# Patient Record
Sex: FEMALE | Race: WHITE | Employment: OTHER | URBAN - METROPOLITAN AREA
[De-identification: names, ages, dates, MRNs, and addresses within clinical notes are randomized per-mention and may not be internally consistent; named-entity substitution may affect disease eponyms.]

---

## 2018-08-06 ENCOUNTER — HOSPITAL ENCOUNTER (OUTPATIENT)
Dept: LAB | Facility: CLINIC | Age: 83
Discharge: HOME | End: 2018-08-06
Attending: ORTHOPAEDIC SURGERY | Admitting: ORTHOPAEDIC SURGERY
Payer: MEDICARE

## 2018-08-06 DIAGNOSIS — Z01.812 PRE-OPERATIVE LABORATORY EXAMINATION: ICD-10-CM

## 2018-08-06 LAB
MRSA DNA SPEC QL NAA+PROBE: NEGATIVE
SPECIMEN SOURCE: NORMAL

## 2018-08-06 PROCEDURE — 87641 MR-STAPH DNA AMP PROBE: CPT | Performed by: ORTHOPAEDIC SURGERY

## 2018-08-06 PROCEDURE — 87640 STAPH A DNA AMP PROBE: CPT | Performed by: ORTHOPAEDIC SURGERY

## 2018-08-06 PROCEDURE — 40000830 ZZHCL STATISTIC STAPH AUREUS METH RESIST SCREEN PCR: Performed by: ORTHOPAEDIC SURGERY

## 2018-08-06 PROCEDURE — 40000829 ZZHCL STATISTIC STAPH AUREUS SUSCEPT SCREEN PCR: Performed by: ORTHOPAEDIC SURGERY

## 2018-08-07 RX ORDER — MUPIROCIN 20 MG/G
OINTMENT TOPICAL 2 TIMES DAILY
COMMUNITY
End: 2018-08-07

## 2018-08-10 ENCOUNTER — TRANSFERRED RECORDS (OUTPATIENT)
Dept: HEALTH INFORMATION MANAGEMENT | Facility: CLINIC | Age: 83
End: 2018-08-10

## 2018-08-10 LAB
CREAT SERPL-MCNC: 1.23 MG/DL (ref 0.55–1.02)
GFR SERPL CREATININE-BSD FRML MDRD: 39 ML/MIN/1.73M2
INR PPP: 1 (ref 0.9–1.1)
TSH SERPL-ACNC: 3.16 UIU/ML (ref 0.3–4.5)

## 2018-08-13 ENCOUNTER — TRANSFERRED RECORDS (OUTPATIENT)
Dept: HEALTH INFORMATION MANAGEMENT | Facility: CLINIC | Age: 83
End: 2018-08-13

## 2018-08-13 LAB — EJECTION FRACTION: 65

## 2018-08-16 ENCOUNTER — TRANSFERRED RECORDS (OUTPATIENT)
Dept: HEALTH INFORMATION MANAGEMENT | Facility: CLINIC | Age: 83
End: 2018-08-16

## 2018-08-17 RX ORDER — MULTIPLE VITAMINS W/ MINERALS TAB 9MG-400MCG
1 TAB ORAL DAILY
COMMUNITY

## 2018-08-17 RX ORDER — FLUTICASONE PROPIONATE 50 MCG
1-2 SPRAY, SUSPENSION (ML) NASAL DAILY PRN
COMMUNITY

## 2018-08-20 ENCOUNTER — HOSPITAL ENCOUNTER (INPATIENT)
Facility: CLINIC | Age: 83
LOS: 2 days | Discharge: HOME | DRG: 470 | End: 2018-08-22
Attending: ORTHOPAEDIC SURGERY | Admitting: ORTHOPAEDIC SURGERY
Payer: MEDICARE

## 2018-08-20 ENCOUNTER — ANESTHESIA (OUTPATIENT)
Dept: SURGERY | Facility: CLINIC | Age: 83
DRG: 470 | End: 2018-08-20
Payer: MEDICARE

## 2018-08-20 ENCOUNTER — ANESTHESIA EVENT (OUTPATIENT)
Dept: SURGERY | Facility: CLINIC | Age: 83
DRG: 470 | End: 2018-08-20
Payer: MEDICARE

## 2018-08-20 ENCOUNTER — APPOINTMENT (OUTPATIENT)
Dept: GENERAL RADIOLOGY | Facility: CLINIC | Age: 83
DRG: 470 | End: 2018-08-20
Attending: ORTHOPAEDIC SURGERY
Payer: MEDICARE

## 2018-08-20 DIAGNOSIS — Z96.641 STATUS POST RIGHT HIP REPLACEMENT: Primary | ICD-10-CM

## 2018-08-20 PROBLEM — Z96.649 S/P HIP REPLACEMENT: Status: ACTIVE | Noted: 2018-08-20

## 2018-08-20 LAB
ABO + RH BLD: NORMAL
ABO + RH BLD: NORMAL
ANION GAP SERPL CALCULATED.3IONS-SCNC: 11 MMOL/L (ref 3–14)
BLD GP AB SCN SERPL QL: NORMAL
BLOOD BANK CMNT PATIENT-IMP: NORMAL
BUN SERPL-MCNC: 25 MG/DL (ref 7–30)
CALCIUM SERPL-MCNC: 9.5 MG/DL (ref 8.5–10.1)
CHLORIDE SERPL-SCNC: 110 MMOL/L (ref 94–109)
CO2 SERPL-SCNC: 21 MMOL/L (ref 20–32)
CREAT SERPL-MCNC: 1.31 MG/DL (ref 0.52–1.04)
GFR SERPL CREATININE-BSD FRML MDRD: 38 ML/MIN/1.7M2
GLUCOSE SERPL-MCNC: 89 MG/DL (ref 70–99)
HGB BLD-MCNC: 15.2 G/DL (ref 11.7–15.7)
PLATELET # BLD AUTO: 171 10E9/L (ref 150–450)
POTASSIUM SERPL-SCNC: 4.4 MMOL/L (ref 3.4–5.3)
SODIUM SERPL-SCNC: 142 MMOL/L (ref 133–144)
SPECIMEN EXP DATE BLD: NORMAL

## 2018-08-20 PROCEDURE — 25000128 H RX IP 250 OP 636: Performed by: ORTHOPAEDIC SURGERY

## 2018-08-20 PROCEDURE — A9270 NON-COVERED ITEM OR SERVICE: HCPCS | Mod: GY | Performed by: ANESTHESIOLOGY

## 2018-08-20 PROCEDURE — 25000132 ZZH RX MED GY IP 250 OP 250 PS 637: Mod: GY | Performed by: ORTHOPAEDIC SURGERY

## 2018-08-20 PROCEDURE — 71000012 ZZH RECOVERY PHASE 1 LEVEL 1 FIRST HR: Performed by: ORTHOPAEDIC SURGERY

## 2018-08-20 PROCEDURE — 27210794 ZZH OR GENERAL SUPPLY STERILE: Performed by: ORTHOPAEDIC SURGERY

## 2018-08-20 PROCEDURE — 86901 BLOOD TYPING SEROLOGIC RH(D): CPT | Performed by: ORTHOPAEDIC SURGERY

## 2018-08-20 PROCEDURE — 25000132 ZZH RX MED GY IP 250 OP 250 PS 637: Mod: GY | Performed by: ANESTHESIOLOGY

## 2018-08-20 PROCEDURE — 25000125 ZZHC RX 250: Performed by: ORTHOPAEDIC SURGERY

## 2018-08-20 PROCEDURE — 80048 BASIC METABOLIC PNL TOTAL CA: CPT | Performed by: ORTHOPAEDIC SURGERY

## 2018-08-20 PROCEDURE — 36000063 ZZH SURGERY LEVEL 4 EA 15 ADDTL MIN: Performed by: ORTHOPAEDIC SURGERY

## 2018-08-20 PROCEDURE — 37000008 ZZH ANESTHESIA TECHNICAL FEE, 1ST 30 MIN: Performed by: ORTHOPAEDIC SURGERY

## 2018-08-20 PROCEDURE — 25000128 H RX IP 250 OP 636

## 2018-08-20 PROCEDURE — 40000986 XR PELVIS AD HIP PORTABLE RIGHT 1 VIEW

## 2018-08-20 PROCEDURE — 36000093 ZZH SURGERY LEVEL 4 1ST 30 MIN: Performed by: ORTHOPAEDIC SURGERY

## 2018-08-20 PROCEDURE — 25000566 ZZH SEVOFLURANE, EA 15 MIN: Performed by: ORTHOPAEDIC SURGERY

## 2018-08-20 PROCEDURE — 86850 RBC ANTIBODY SCREEN: CPT | Performed by: ORTHOPAEDIC SURGERY

## 2018-08-20 PROCEDURE — 85049 AUTOMATED PLATELET COUNT: CPT | Performed by: ORTHOPAEDIC SURGERY

## 2018-08-20 PROCEDURE — C1776 JOINT DEVICE (IMPLANTABLE): HCPCS | Performed by: ORTHOPAEDIC SURGERY

## 2018-08-20 PROCEDURE — 40000169 ZZH STATISTIC PRE-PROCEDURE ASSESSMENT I: Performed by: ORTHOPAEDIC SURGERY

## 2018-08-20 PROCEDURE — 37000009 ZZH ANESTHESIA TECHNICAL FEE, EACH ADDTL 15 MIN: Performed by: ORTHOPAEDIC SURGERY

## 2018-08-20 PROCEDURE — 71000013 ZZH RECOVERY PHASE 1 LEVEL 1 EA ADDTL HR: Performed by: ORTHOPAEDIC SURGERY

## 2018-08-20 PROCEDURE — 12000007 ZZH R&B INTERMEDIATE

## 2018-08-20 PROCEDURE — 25800025 ZZH RX 258: Performed by: ORTHOPAEDIC SURGERY

## 2018-08-20 PROCEDURE — 86900 BLOOD TYPING SEROLOGIC ABO: CPT | Performed by: ORTHOPAEDIC SURGERY

## 2018-08-20 PROCEDURE — A9270 NON-COVERED ITEM OR SERVICE: HCPCS | Mod: GY | Performed by: ORTHOPAEDIC SURGERY

## 2018-08-20 PROCEDURE — 25000125 ZZHC RX 250

## 2018-08-20 PROCEDURE — 0SR901A REPLACEMENT OF RIGHT HIP JOINT WITH METAL SYNTHETIC SUBSTITUTE, UNCEMENTED, OPEN APPROACH: ICD-10-PCS | Performed by: ORTHOPAEDIC SURGERY

## 2018-08-20 PROCEDURE — 25000128 H RX IP 250 OP 636: Performed by: ANESTHESIOLOGY

## 2018-08-20 PROCEDURE — 85018 HEMOGLOBIN: CPT | Performed by: ORTHOPAEDIC SURGERY

## 2018-08-20 PROCEDURE — 27210995 ZZH RX 272: Performed by: ORTHOPAEDIC SURGERY

## 2018-08-20 PROCEDURE — C1713 ANCHOR/SCREW BN/BN,TIS/BN: HCPCS | Performed by: ORTHOPAEDIC SURGERY

## 2018-08-20 DEVICE — IMPLANTABLE DEVICE: Type: IMPLANTABLE DEVICE | Site: HIP | Status: FUNCTIONAL

## 2018-08-20 DEVICE — IMP HEAD FEMORAL BIOM MOD 36MM -3  11-363661: Type: IMPLANTABLE DEVICE | Site: HIP | Status: FUNCTIONAL

## 2018-08-20 DEVICE — IMP SHELL BIOM G7 ACETAB PPS LIM HOLE 50MM SZ D 010000662: Type: IMPLANTABLE DEVICE | Site: HIP | Status: FUNCTIONAL

## 2018-08-20 DEVICE — IMP SCR BIOM G7 ACETABULAR DOME 6.5X25MM LOW PRO 010000998: Type: IMPLANTABLE DEVICE | Site: HIP | Status: FUNCTIONAL

## 2018-08-20 RX ORDER — METHOCARBAMOL 500 MG/1
500 TABLET, FILM COATED ORAL 4 TIMES DAILY PRN
Status: DISCONTINUED | OUTPATIENT
Start: 2018-08-20 | End: 2018-08-22 | Stop reason: HOSPADM

## 2018-08-20 RX ORDER — DEXTROSE MONOHYDRATE, SODIUM CHLORIDE, AND POTASSIUM CHLORIDE 50; 1.49; 4.5 G/1000ML; G/1000ML; G/1000ML
INJECTION, SOLUTION INTRAVENOUS CONTINUOUS
Status: DISCONTINUED | OUTPATIENT
Start: 2018-08-20 | End: 2018-08-22 | Stop reason: HOSPADM

## 2018-08-20 RX ORDER — HYDROMORPHONE HYDROCHLORIDE 1 MG/ML
INJECTION, SOLUTION INTRAMUSCULAR; INTRAVENOUS; SUBCUTANEOUS
Status: DISCONTINUED
Start: 2018-08-20 | End: 2018-08-20 | Stop reason: WASHOUT

## 2018-08-20 RX ORDER — ONDANSETRON 4 MG/1
4 TABLET, ORALLY DISINTEGRATING ORAL EVERY 6 HOURS PRN
Status: DISCONTINUED | OUTPATIENT
Start: 2018-08-20 | End: 2018-08-22 | Stop reason: HOSPADM

## 2018-08-20 RX ORDER — OXYCODONE HYDROCHLORIDE 5 MG/1
5 TABLET ORAL
Status: DISCONTINUED | OUTPATIENT
Start: 2018-08-20 | End: 2018-08-22 | Stop reason: HOSPADM

## 2018-08-20 RX ORDER — CEFAZOLIN SODIUM 1 G/3ML
1 INJECTION, POWDER, FOR SOLUTION INTRAMUSCULAR; INTRAVENOUS SEE ADMIN INSTRUCTIONS
Status: DISCONTINUED | OUTPATIENT
Start: 2018-08-20 | End: 2018-08-20

## 2018-08-20 RX ORDER — ONDANSETRON 4 MG/1
4 TABLET, ORALLY DISINTEGRATING ORAL EVERY 30 MIN PRN
Status: DISCONTINUED | OUTPATIENT
Start: 2018-08-20 | End: 2018-08-20 | Stop reason: HOSPADM

## 2018-08-20 RX ORDER — TEMAZEPAM 7.5 MG/1
7.5 CAPSULE ORAL
Status: DISCONTINUED | OUTPATIENT
Start: 2018-08-21 | End: 2018-08-22 | Stop reason: HOSPADM

## 2018-08-20 RX ORDER — EPHEDRINE SULFATE 50 MG/ML
INJECTION, SOLUTION INTRAMUSCULAR; INTRAVENOUS; SUBCUTANEOUS PRN
Status: DISCONTINUED | OUTPATIENT
Start: 2018-08-20 | End: 2018-08-20

## 2018-08-20 RX ORDER — FEXOFENADINE HCL 180 MG/1
180 TABLET ORAL DAILY PRN
Status: DISCONTINUED | OUTPATIENT
Start: 2018-08-20 | End: 2018-08-22 | Stop reason: HOSPADM

## 2018-08-20 RX ORDER — LIDOCAINE HYDROCHLORIDE 20 MG/ML
INJECTION, SOLUTION INFILTRATION; PERINEURAL PRN
Status: DISCONTINUED | OUTPATIENT
Start: 2018-08-20 | End: 2018-08-20

## 2018-08-20 RX ORDER — DEXAMETHASONE SODIUM PHOSPHATE 4 MG/ML
INJECTION, SOLUTION INTRA-ARTICULAR; INTRALESIONAL; INTRAMUSCULAR; INTRAVENOUS; SOFT TISSUE PRN
Status: DISCONTINUED | OUTPATIENT
Start: 2018-08-20 | End: 2018-08-20

## 2018-08-20 RX ORDER — HYDROMORPHONE HYDROCHLORIDE 1 MG/ML
.3-.5 INJECTION, SOLUTION INTRAMUSCULAR; INTRAVENOUS; SUBCUTANEOUS EVERY 5 MIN PRN
Status: DISCONTINUED | OUTPATIENT
Start: 2018-08-20 | End: 2018-08-20 | Stop reason: HOSPADM

## 2018-08-20 RX ORDER — ONDANSETRON 2 MG/ML
4 INJECTION INTRAMUSCULAR; INTRAVENOUS EVERY 6 HOURS PRN
Status: DISCONTINUED | OUTPATIENT
Start: 2018-08-20 | End: 2018-08-22 | Stop reason: HOSPADM

## 2018-08-20 RX ORDER — ONDANSETRON 2 MG/ML
INJECTION INTRAMUSCULAR; INTRAVENOUS PRN
Status: DISCONTINUED | OUTPATIENT
Start: 2018-08-20 | End: 2018-08-20

## 2018-08-20 RX ORDER — LIDOCAINE 40 MG/G
CREAM TOPICAL
Status: DISCONTINUED | OUTPATIENT
Start: 2018-08-20 | End: 2018-08-22 | Stop reason: HOSPADM

## 2018-08-20 RX ORDER — SODIUM CHLORIDE, SODIUM LACTATE, POTASSIUM CHLORIDE, CALCIUM CHLORIDE 600; 310; 30; 20 MG/100ML; MG/100ML; MG/100ML; MG/100ML
INJECTION, SOLUTION INTRAVENOUS CONTINUOUS
Status: DISCONTINUED | OUTPATIENT
Start: 2018-08-20 | End: 2018-08-20 | Stop reason: HOSPADM

## 2018-08-20 RX ORDER — ACETAMINOPHEN 325 MG/1
650 TABLET ORAL EVERY 4 HOURS PRN
Status: DISCONTINUED | OUTPATIENT
Start: 2018-08-23 | End: 2018-08-22 | Stop reason: HOSPADM

## 2018-08-20 RX ORDER — CEFAZOLIN SODIUM 1 G/3ML
1 INJECTION, POWDER, FOR SOLUTION INTRAMUSCULAR; INTRAVENOUS EVERY 8 HOURS
Status: COMPLETED | OUTPATIENT
Start: 2018-08-20 | End: 2018-08-21

## 2018-08-20 RX ORDER — DIPHENHYDRAMINE HYDROCHLORIDE 50 MG/ML
12.5 INJECTION INTRAMUSCULAR; INTRAVENOUS EVERY 6 HOURS PRN
Status: DISCONTINUED | OUTPATIENT
Start: 2018-08-20 | End: 2018-08-22 | Stop reason: HOSPADM

## 2018-08-20 RX ORDER — FENTANYL CITRATE 50 UG/ML
INJECTION, SOLUTION INTRAMUSCULAR; INTRAVENOUS PRN
Status: DISCONTINUED | OUTPATIENT
Start: 2018-08-20 | End: 2018-08-20

## 2018-08-20 RX ORDER — NALOXONE HYDROCHLORIDE 0.4 MG/ML
.1-.4 INJECTION, SOLUTION INTRAMUSCULAR; INTRAVENOUS; SUBCUTANEOUS
Status: DISCONTINUED | OUTPATIENT
Start: 2018-08-20 | End: 2018-08-20

## 2018-08-20 RX ORDER — PROPOFOL 10 MG/ML
INJECTION, EMULSION INTRAVENOUS PRN
Status: DISCONTINUED | OUTPATIENT
Start: 2018-08-20 | End: 2018-08-20

## 2018-08-20 RX ORDER — AMOXICILLIN 250 MG
1 CAPSULE ORAL 2 TIMES DAILY
Status: DISCONTINUED | OUTPATIENT
Start: 2018-08-20 | End: 2018-08-22 | Stop reason: HOSPADM

## 2018-08-20 RX ORDER — ONDANSETRON 2 MG/ML
4 INJECTION INTRAMUSCULAR; INTRAVENOUS EVERY 30 MIN PRN
Status: DISCONTINUED | OUTPATIENT
Start: 2018-08-20 | End: 2018-08-20 | Stop reason: HOSPADM

## 2018-08-20 RX ORDER — SODIUM CHLORIDE, SODIUM LACTATE, POTASSIUM CHLORIDE, CALCIUM CHLORIDE 600; 310; 30; 20 MG/100ML; MG/100ML; MG/100ML; MG/100ML
INJECTION, SOLUTION INTRAVENOUS CONTINUOUS
Status: DISCONTINUED | OUTPATIENT
Start: 2018-08-20 | End: 2018-08-20

## 2018-08-20 RX ORDER — PROCHLORPERAZINE MALEATE 5 MG
5 TABLET ORAL EVERY 6 HOURS PRN
Status: DISCONTINUED | OUTPATIENT
Start: 2018-08-20 | End: 2018-08-22 | Stop reason: HOSPADM

## 2018-08-20 RX ORDER — FLUTICASONE PROPIONATE 50 MCG
1-2 SPRAY, SUSPENSION (ML) NASAL DAILY PRN
Status: DISCONTINUED | OUTPATIENT
Start: 2018-08-20 | End: 2018-08-22 | Stop reason: HOSPADM

## 2018-08-20 RX ORDER — METOPROLOL TARTRATE 1 MG/ML
INJECTION, SOLUTION INTRAVENOUS PRN
Status: DISCONTINUED | OUTPATIENT
Start: 2018-08-20 | End: 2018-08-20

## 2018-08-20 RX ORDER — ACETAMINOPHEN 500 MG
1000 TABLET ORAL ONCE
Status: COMPLETED | OUTPATIENT
Start: 2018-08-20 | End: 2018-08-20

## 2018-08-20 RX ORDER — AMOXICILLIN 250 MG
2 CAPSULE ORAL 2 TIMES DAILY
Status: DISCONTINUED | OUTPATIENT
Start: 2018-08-20 | End: 2018-08-22 | Stop reason: HOSPADM

## 2018-08-20 RX ORDER — NEOSTIGMINE METHYLSULFATE 1 MG/ML
VIAL (ML) INJECTION PRN
Status: DISCONTINUED | OUTPATIENT
Start: 2018-08-20 | End: 2018-08-20

## 2018-08-20 RX ORDER — ESMOLOL HYDROCHLORIDE 10 MG/ML
INJECTION INTRAVENOUS PRN
Status: DISCONTINUED | OUTPATIENT
Start: 2018-08-20 | End: 2018-08-20

## 2018-08-20 RX ORDER — MAGNESIUM HYDROXIDE 1200 MG/15ML
LIQUID ORAL PRN
Status: DISCONTINUED | OUTPATIENT
Start: 2018-08-20 | End: 2018-08-20 | Stop reason: HOSPADM

## 2018-08-20 RX ORDER — NALOXONE HYDROCHLORIDE 0.4 MG/ML
.1-.4 INJECTION, SOLUTION INTRAMUSCULAR; INTRAVENOUS; SUBCUTANEOUS
Status: DISCONTINUED | OUTPATIENT
Start: 2018-08-20 | End: 2018-08-22 | Stop reason: HOSPADM

## 2018-08-20 RX ORDER — CEFAZOLIN SODIUM 2 G/100ML
2 INJECTION, SOLUTION INTRAVENOUS
Status: COMPLETED | OUTPATIENT
Start: 2018-08-20 | End: 2018-08-20

## 2018-08-20 RX ORDER — FENTANYL CITRATE 50 UG/ML
25-50 INJECTION, SOLUTION INTRAMUSCULAR; INTRAVENOUS
Status: DISCONTINUED | OUTPATIENT
Start: 2018-08-20 | End: 2018-08-20 | Stop reason: HOSPADM

## 2018-08-20 RX ORDER — ACETAMINOPHEN 325 MG/1
975 TABLET ORAL EVERY 8 HOURS
Status: DISCONTINUED | OUTPATIENT
Start: 2018-08-20 | End: 2018-08-22 | Stop reason: HOSPADM

## 2018-08-20 RX ORDER — GLYCOPYRROLATE 0.2 MG/ML
INJECTION, SOLUTION INTRAMUSCULAR; INTRAVENOUS PRN
Status: DISCONTINUED | OUTPATIENT
Start: 2018-08-20 | End: 2018-08-20

## 2018-08-20 RX ORDER — DIPHENHYDRAMINE HCL 12.5MG/5ML
12.5 LIQUID (ML) ORAL EVERY 6 HOURS PRN
Status: DISCONTINUED | OUTPATIENT
Start: 2018-08-20 | End: 2018-08-22 | Stop reason: HOSPADM

## 2018-08-20 RX ADMIN — ROCURONIUM BROMIDE 15 MG: 10 INJECTION INTRAVENOUS at 11:48

## 2018-08-20 RX ADMIN — POTASSIUM CHLORIDE, DEXTROSE MONOHYDRATE AND SODIUM CHLORIDE: 150; 5; 450 INJECTION, SOLUTION INTRAVENOUS at 15:15

## 2018-08-20 RX ADMIN — NEOSTIGMINE METHYLSULFATE 3 MG: 1 INJECTION, SOLUTION INTRAVENOUS at 12:58

## 2018-08-20 RX ADMIN — SODIUM CHLORIDE, POTASSIUM CHLORIDE, SODIUM LACTATE AND CALCIUM CHLORIDE: 600; 310; 30; 20 INJECTION, SOLUTION INTRAVENOUS at 10:46

## 2018-08-20 RX ADMIN — ONDANSETRON 4 MG: 2 INJECTION INTRAMUSCULAR; INTRAVENOUS at 12:52

## 2018-08-20 RX ADMIN — Medication 5 MG: at 12:44

## 2018-08-20 RX ADMIN — FENTANYL CITRATE 50 MCG: 50 INJECTION, SOLUTION INTRAMUSCULAR; INTRAVENOUS at 11:54

## 2018-08-20 RX ADMIN — PHENYLEPHRINE HYDROCHLORIDE 100 MCG: 10 INJECTION, SOLUTION INTRAMUSCULAR; INTRAVENOUS; SUBCUTANEOUS at 11:17

## 2018-08-20 RX ADMIN — DEXAMETHASONE SODIUM PHOSPHATE 4 MG: 4 INJECTION, SOLUTION INTRA-ARTICULAR; INTRALESIONAL; INTRAMUSCULAR; INTRAVENOUS; SOFT TISSUE at 11:48

## 2018-08-20 RX ADMIN — PHENYLEPHRINE HYDROCHLORIDE 150 MCG: 10 INJECTION, SOLUTION INTRAMUSCULAR; INTRAVENOUS; SUBCUTANEOUS at 12:44

## 2018-08-20 RX ADMIN — PHENYLEPHRINE HYDROCHLORIDE 50 MCG: 10 INJECTION, SOLUTION INTRAMUSCULAR; INTRAVENOUS; SUBCUTANEOUS at 12:19

## 2018-08-20 RX ADMIN — ACETAMINOPHEN 975 MG: 325 TABLET, FILM COATED ORAL at 15:40

## 2018-08-20 RX ADMIN — METOPROLOL TARTRATE 1 MG: 5 INJECTION INTRAVENOUS at 11:22

## 2018-08-20 RX ADMIN — ACETAMINOPHEN 975 MG: 325 TABLET, FILM COATED ORAL at 23:32

## 2018-08-20 RX ADMIN — Medication 0.3 MG: at 14:01

## 2018-08-20 RX ADMIN — ESMOLOL HYDROCHLORIDE 5 MG: 10 INJECTION, SOLUTION INTRAVENOUS at 13:03

## 2018-08-20 RX ADMIN — METOPROLOL TARTRATE 1 MG: 5 INJECTION INTRAVENOUS at 11:45

## 2018-08-20 RX ADMIN — PHENYLEPHRINE HYDROCHLORIDE 100 MCG: 10 INJECTION, SOLUTION INTRAMUSCULAR; INTRAVENOUS; SUBCUTANEOUS at 11:25

## 2018-08-20 RX ADMIN — LIDOCAINE HYDROCHLORIDE 80 MG: 20 INJECTION, SOLUTION INFILTRATION; PERINEURAL at 11:17

## 2018-08-20 RX ADMIN — PHENYLEPHRINE HYDROCHLORIDE 100 MCG: 10 INJECTION, SOLUTION INTRAMUSCULAR; INTRAVENOUS; SUBCUTANEOUS at 11:34

## 2018-08-20 RX ADMIN — SODIUM CHLORIDE, POTASSIUM CHLORIDE, SODIUM LACTATE AND CALCIUM CHLORIDE: 600; 310; 30; 20 INJECTION, SOLUTION INTRAVENOUS at 13:09

## 2018-08-20 RX ADMIN — METOPROLOL TARTRATE 1 MG: 5 INJECTION INTRAVENOUS at 11:21

## 2018-08-20 RX ADMIN — OXYCODONE HYDROCHLORIDE 5 MG: 5 TABLET ORAL at 16:02

## 2018-08-20 RX ADMIN — ESMOLOL HYDROCHLORIDE 10 MG: 10 INJECTION, SOLUTION INTRAVENOUS at 13:16

## 2018-08-20 RX ADMIN — GLYCOPYRROLATE 0.4 MG: 0.2 INJECTION, SOLUTION INTRAMUSCULAR; INTRAVENOUS at 12:58

## 2018-08-20 RX ADMIN — ESMOLOL HYDROCHLORIDE 5 MG: 10 INJECTION, SOLUTION INTRAVENOUS at 13:05

## 2018-08-20 RX ADMIN — ROCURONIUM BROMIDE 35 MG: 10 INJECTION INTRAVENOUS at 11:17

## 2018-08-20 RX ADMIN — PHENYLEPHRINE HYDROCHLORIDE 100 MCG: 10 INJECTION, SOLUTION INTRAMUSCULAR; INTRAVENOUS; SUBCUTANEOUS at 11:47

## 2018-08-20 RX ADMIN — CEFAZOLIN SODIUM 1 G: 1 INJECTION, POWDER, FOR SOLUTION INTRAMUSCULAR; INTRAVENOUS at 21:33

## 2018-08-20 RX ADMIN — PROPOFOL 190 MG: 10 INJECTION, EMULSION INTRAVENOUS at 11:17

## 2018-08-20 RX ADMIN — PHENYLEPHRINE HYDROCHLORIDE 50 MCG: 10 INJECTION, SOLUTION INTRAMUSCULAR; INTRAVENOUS; SUBCUTANEOUS at 12:55

## 2018-08-20 RX ADMIN — FENTANYL CITRATE 100 MCG: 50 INJECTION, SOLUTION INTRAMUSCULAR; INTRAVENOUS at 11:17

## 2018-08-20 RX ADMIN — TRANEXAMIC ACID 1 G: 100 INJECTION, SOLUTION INTRAVENOUS at 11:41

## 2018-08-20 RX ADMIN — METOPROLOL TARTRATE 1 MG: 5 INJECTION INTRAVENOUS at 11:19

## 2018-08-20 RX ADMIN — METOPROLOL TARTRATE 1 MG: 5 INJECTION INTRAVENOUS at 11:20

## 2018-08-20 RX ADMIN — PHENYLEPHRINE HYDROCHLORIDE 50 MCG: 10 INJECTION, SOLUTION INTRAMUSCULAR; INTRAVENOUS; SUBCUTANEOUS at 12:34

## 2018-08-20 RX ADMIN — Medication 5 MG: at 11:47

## 2018-08-20 RX ADMIN — ACETAMINOPHEN 1000 MG: 500 TABLET, FILM COATED ORAL at 10:56

## 2018-08-20 RX ADMIN — ESMOLOL HYDROCHLORIDE 10 MG: 10 INJECTION, SOLUTION INTRAVENOUS at 13:10

## 2018-08-20 RX ADMIN — FENTANYL CITRATE 50 MCG: 50 INJECTION, SOLUTION INTRAMUSCULAR; INTRAVENOUS at 12:04

## 2018-08-20 RX ADMIN — OXYCODONE HYDROCHLORIDE 5 MG: 5 TABLET ORAL at 23:32

## 2018-08-20 RX ADMIN — CEFAZOLIN SODIUM 2 G: 2 INJECTION, SOLUTION INTRAVENOUS at 11:33

## 2018-08-20 ASSESSMENT — ACTIVITIES OF DAILY LIVING (ADL)
ADLS_ACUITY_SCORE: 10
ADLS_ACUITY_SCORE: 10

## 2018-08-20 ASSESSMENT — ENCOUNTER SYMPTOMS
DYSRHYTHMIAS: 1
SEIZURES: 0

## 2018-08-20 ASSESSMENT — LIFESTYLE VARIABLES: TOBACCO_USE: 0

## 2018-08-20 NOTE — IP AVS SNAPSHOT
[address] [facility]    [address] [place] [address]    Phone:  [phone]                                       After Visit Summary   8/20/2018    Serenity Kenyon    MRN: [id number]           After Visit Summary Signature Page     I have received my discharge instructions, and my questions have been answered. I have discussed any challenges I see with this plan with the nurse or doctor.    ..........................................................................................................................................  Patient/Patient Representative Signature      ..........................................................................................................................................  Patient Representative Print Name and Relationship to Patient    ..................................................               ................................................  Date                                            Time    ..........................................................................................................................................  Reviewed by Signature/Title    ...................................................              ..............................................  Date                                                            Time

## 2018-08-20 NOTE — OP NOTE
PATIENT NAME:  Serenity Kenyon  MEDICAL RECORD:  [id number]  PATIENT BIRTHDAY:  4/28/1929  DATE OF SURGERY: 8/20/2018    SURGEON:  Naren Kimball MD    1st  ASSISTANTt:  JALIL Pittman OPA-C      PREOPERATIVE DIAGNOSIS:  Degenerative osteoarthritis right hip.      POSTOPERATIVE DIAGNOSIS:  Degenerative osteoarthritis right hip.      PROCEDURE:  right total hip arthroplasty.     COMPONENTS:  Biomet - 50 mm G7 porous plasma coated acetabular shell, 10 degree 36 mm acetabular liner, size 4 Taperloc  Porous  femoral stem, -3 mm length, 36 mm diameter femoral head.      ANESTHESIA:  Spinal     INDICATIONS FOR PROCEDURE:  The patient was brought to the operating room for elective total hip arthroplasty for end-stage osteoarthritis.  The patient is given preoperative IV antibiotics and these will be continued for 24 hours.  The patient will also receive anticoagulation for postoperative thrombosis prophylaxis.  The patient understands the indications, alternatives, risks, benefits, and time involved for recovery and has consented to move ahead with the procedure.       DESCRIPTION OF PROCEDURE:  The patient was brought to the operating room and following suitable Spinal anesthesia, the patient was placed in the lateral decubitus position and secured with the Wixson hip murcia.      The right hip was prepped and draped in the usual manner.      A full timeout was carried out and the patient, correct extremity, operative site and procedure were identified and confirmed by the entire operative team.      We then moved ahead with the single incision posterolateral approach.  A 10 cm incision was made over the posterolateral aspect of the hip and sharp dissection carried down through the subcutaneous tissue.  The gluteus yana fascia was divided and the muscle split in line with its fibers.  The gluteus medius was retracted anteriorly, the piriformis tagged and released and the short external rotators released.      The  capsule was opened posteriorly in a T- fashion.  The hip was dislocated without difficulty.      The femoral head was removed at the predetermined level and the femur retracted anteriorly to expose the acetabulum.     There was severe wear on the femoral head and severe wear on the acetabular surface.       I excised the labrum circumferentially and did a posterior capsulectomy.      We then prepared the acetabulum with serial reaming to a 49 mm size.  We press-fit in a 50 mm G7 porous plasma coated cup and secured it with a single 25 mm screw.  A trial 109 degree 36 mm liner was positioned.      Next, attention was directed to the femur.  We rasped the femur to the 4 size and trialed off the rasp.  Once we had assured good position and selection of components, we removed the trial components and the femoral rasp.  We secured the size  4 Taperloc porous stem.  This was solid and in good position.      The 10 degree 36 mm final liner was secured on the acetabular shell.       The final -3 mm length, 36 mm diameter femoral head component was secured and the hip reduced one final time.  We checked for motion, stability, alignment and leg lengths, all of which were excellent.      The wound was irrigated throughout with antibiotic solution using jet lavage.  It was closed over a medium Hemovac drain with 0 Ethibond in the piriformis repaired back to the greater trochanter.  0 Ethibond interrupted sutures and V-Loc running suture was used in the deep fascia of the gluteus yana.  The subcutaneous tissue was closed with 0 and 2-0 Vicryl and the skin with skin staples.      Sterile bandage was applied.  The patient was transferred onto a cart and taken to [facility] in satisfactory condition.  There were no known intraoperative complications.     A surgical assistant was medically necessary for this case for pre-op positioning as well as intra-op retraction and extremity support and positioning.  He was present the entire case.           SORAIDA CARRENO MD      CC  Soraida Carreno MD         [phone]  Fax

## 2018-08-20 NOTE — PLAN OF CARE
Problem: Hip Arthroplasty (Total, Partial) (Adult)  Goal: Signs and Symptoms of Listed Potential Problems Will be Absent, Minimized or Managed (Hip Arthroplasty)  Signs and symptoms of listed potential problems will be absent, minimized or managed by discharge/transition of care (reference Hip Arthroplasty (Total, Partial) (Adult) CPG).   Outcome: No Change  Pt A/OX4. VSS. Reports pain 3/10 using oxycodone with relief. Regular diet tolerated. CMS intact. Dressing c/d/i. Ramsey patent. Hemovac patent.

## 2018-08-20 NOTE — IP AVS SNAPSHOT
MRN:[id number]                      After Visit Summary   8/20/2018    Serenity Kenyon    MRN: [id number]           Thank you!     Thank you for choosing [facility] for your care. Our goal is always to provide you with excellent care. Hearing back from our patients is one way we can continue to improve our services. Please take a few minutes to complete the written survey that you may receive in the mail after you visit with us. Thank you!        Patient Information     Date Of Birth          4/28/1929        Designated Caregiver       Most Recent Value    Caregiver    Will someone help with your care after discharge? yes    Name of designated caregiver Michelle    Phone number of caregiver [phone]    Caregiver address [address] [facility]      About your hospital stay     You were admitted on:  August 20, 2018 You last received care in the:  [place] [address] [facility]    You were discharged on:  August 22, 2018        Reason for your hospital stay       ISA                  Who to Call     For medical emergencies, please call 911.  For non-urgent questions about your medical care, please call your primary care provider or clinic, None  For questions related to your surgery, please call your surgery clinic        Attending Provider     Provider Soraida Galdamez MD Orthopedics       Primary Care Provider Fax #    Marzena Pinont [phone]      Follow-up Appointments     Follow-up and recommended labs and tests        Office visit prearranged                  Further instructions from your care team       DISCHARGE INSTRUCTIONS FOR YOUR TOTAL HIP REPLACEMENT     SORAIDA CARRENO MD    Wound Care:    Change your dressing daily. Inspect your incision at the time of dressing change for increased redness, swelling, and drainage.  Some discoloration and bruising is usually seen, but call my office if you are concerned or if you see changes in the wound appearance.   Also, call if you develop a fever above 101 degrees.     You may shower directly over the wound beginning 4 days after your surgery, but do not submerge wound under water until after the post operative clinic visit when the sutures are removed and the wound is completely sealed and without any drainage. Keep a dressing over the wound until after your post operative clinic visit.      Activities and outpatient Physical Therapy:  Physical activity may be resumed gradually according to your comfort level and the restrictions on your hip positioning as instructed in the pre-op class and by therapy. Do not cross your legs and do not bend past 90 degrees. You may bear weight as tolerated on the operated leg.  Use crutches or the walker as instructed by Physical Therapy.  Follow your home exercise program as instructed by your therapist.     Wear your anti-embolism stockings day and night until seen for your post operative appointment.  Keep them flat on your skin.  Do not allow them to roll up or crease your skin.  Remove twice daily for one hour at a time.  You may hand wash and air dry your stockings.  Notify my office if you experience calf pain.  Pump your ankles frequently.        If indicated, you may have outpatient physical therapy sessions when your return home. These visits are prescribed for you at the time of your surgery scheduling.  If so, you should have already scheduled your therapy sessions in advance.  If you have not done so, please immediately contact the therapy location of your choice to schedule the appointments for the physical therapy regimen that has been prescribed for you. You may discontinue the crutches or walker per the therapist's recommendation.      Medications:  New medications for you on discharge include a pain medication, a stool softener, and a blood thinner (BEGIN THE ELIQUIS MEDICATION Thursday, 8/23/18)   Detailed instructions come with those medications.  You will also receive  "instructions on when to resume your home medications.       Antibiotic coverage will be needed before any type of dental procedure. This is a life long recommendation.  You should notify your dentist of your total hip surgery and call your dentist or our office one week before a dental appointment for antibiotics.         Post operative clinic appointment:  Your post operative clinic visit has been prearranged.   Call [phone] with any questions.    Naren Kimball MD    Pending Results     Date and Time Order Name Status Description    2018 2030 EKG 12-lead, tracing only Preliminary             Statement of Approval     Ordered          18 0955  I have reviewed and agree with all the recommendations and orders detailed in this document.  EFFECTIVE NOW     Approved and electronically signed by:  Naren Kimball MD             Admission Information     Date & Time Provider Department Dept. Phone    2018 Naren Kimball MD [place] [address] [facility] [phone]      Your Vitals Were     Blood Pressure Pulse Temperature Respirations Height Weight    114/65 (BP Location: Left arm) 85 97.9  F (36.6  C) (Oral) 16 1.524 m (5') 59 kg (130 lb)    Pulse Oximetry BMI (Body Mass Index)                94% 25.39 kg/m2          MyChart Information     [employer] lets you send messages to your doctor, view your test results, renew your prescriptions, schedule appointments and more. To sign up, go to [email or web address] . Click on \"Log in\" on the left side of the screen, which will take you to the Welcome page. Then click on \"Sign up Now\" on the right side of the page.     You will be asked to enter the access code listed below, as well as some personal information. Please follow the directions to create your username and password.     Your access code is: [email or web address]  Expires: 2018 10:46 AM     Your access code will  in 90 days. If you need help or a new code, " please call your [facility] clinic or [phone].        Care EveryWhere ID     This is your Care EveryWhere ID. This could be used by other organizations to access your [facility] medical records  [id number]        Equal Access to Services     CALE HUSTON : Hadii aad ku hadalpesh Acosta, waaxda luqadaha, qaybta kaalmada adedarshana, angela milliganandrei velasconohemi ortiz noman osuna. So [facility] [phone].    ATENCIÓN: Si habla español, tiene a rivera disposición servicios gratuitos de asistencia lingüística. Llame al [phone].    We comply with applicable federal civil rights laws and [place] laws. We do not discriminate on the basis of race, color, national origin, age, disability, sex, sexual orientation, or gender identity.               Review of your medicines      START taking        Dose / Directions    order for DME        Equipment being ordered: Walker Wheels () and Walker () Treatment Diagnosis: Impaired gait   Quantity:  1 each   Refills:  0       oxyCODONE IR 5 MG tablet   Commonly known as:  ROXICODONE        Dose:  5 mg   Take 1 tablet (5 mg) by mouth every 3 hours as needed for other (pain control or improvement in physical function. Hold dose for analgesic side effects.)   Quantity:  40 tablet   Refills:  0       senna-docusate 8.6-50 MG per tablet   Commonly known as:  SENOKOT-S;PERICOLACE        Dose:  1 tablet   Take 1 tablet by mouth 2 times daily   Quantity:  50 tablet   Refills:  0         CONTINUE these medicines which have NOT CHANGED        Dose / Directions    ALLEGRA PO        Dose:  180 mg   Take 180 mg by mouth daily as needed for allergies   Refills:  0       Calcium + D3 600-200 MG-UNIT Tabs        Dose:  2 tablet   Take 2 tablets by mouth daily   Refills:  0       FISH OIL PO        Dose:  1 g   Take 1 g by mouth daily   Refills:  0       fluticasone 50 MCG/ACT spray   Commonly known as:  FLONASE        Dose:  1-2 spray   Spray 1-2 sprays into both nostrils daily as needed    Refills:  0       Multi-vitamin Tabs tablet        Dose:  1 tablet   Take 1 tablet by mouth daily   Refills:  0         STOP taking     ASPIRIN EC PO           mupirocin 2 % nasal ointment   Commonly known as:  BACTROBAN                Where to get your medicines      These medications were sent to [facility] [place], [place] - [address]  [address], [place] [address]     Phone:  [phone]     senna-docusate 8.6-50 MG per tablet         Some of these will need a paper prescription and others can be bought over the counter. Ask your nurse if you have questions.     Bring a paper prescription for each of these medications     order for DME    oxyCODONE IR 5 MG tablet                Protect others around you: Learn how to safely use, store and throw away your medicines at www.disposemymeds.org.        Information about OPIOIDS     PRESCRIPTION OPIOIDS: WHAT YOU NEED TO KNOW   We gave you an opioid (narcotic) pain medicine. It is important to manage your pain, but opioids are not always the best choice. You should first try all the other options your care team gave you. Take this medicine for as short a time (and as few doses) as possible.    Some activities can increase your pain, such as bandage changes or therapy sessions. It may help to take your pain medicine 30 to 60 minutes before these activities. Reduce your stress by getting enough sleep, working on hobbies you enjoy and practicing relaxation or meditation. Talk to your care team about ways to manage your pain beyond prescription opioids.    These medicines have risks:    DO NOT drive when on new or higher doses of pain medicine. These medicines can affect your alertness and reaction times, and you could be arrested for driving under the influence (DUI). If you need to use opioids long-term, talk to your care team about driving.    DO NOT operate heavy machinery    DO NOT do any other dangerous activities while taking  these medicines.    DO NOT drink any alcohol while taking these medicines.     If the opioid prescribed includes acetaminophen, DO NOT take with any other medicines that contain acetaminophen. Read all labels carefully. Look for the word  acetaminophen  or  Tylenol.  Ask your pharmacist if you have questions or are unsure.    You can get addicted to pain medicines, especially if you have a history of addiction (chemical, alcohol or substance dependence). Talk to your care team about ways to reduce this risk.    All opioids tend to cause constipation. Drink plenty of water and eat foods that have a lot of fiber, such as fruits, vegetables, prune juice, apple juice and high-fiber cereal. Take a laxative (Miralax, milk of magnesia, Colace, Senna) if you don t move your bowels at least every other day. Other side effects include upset stomach, sleepiness, dizziness, throwing up, tolerance (needing more of the medicine to have the same effect), physical dependence and slowed breathing.    Store your pills in a secure place, locked if possible. We will not replace any lost or stolen medicine. If you don t finish your medicine, please throw away (dispose) as directed by your pharmacist. The [place] Pollution [employer] has more information about safe disposal: https://[email or web address]/living-green/managing-unwanted-medications             Medication List: This is a list of all your medications and when to take them. Check marks below indicate your daily home schedule. Keep this list as a reference.      Medications           Morning Afternoon Evening Bedtime As Needed    ALLEGRA PO   Take 180 mg by mouth daily as needed for allergies   Next Dose Due:  Available as needed                                   Calcium + D3 600-200 MG-UNIT Tabs   Take 2 tablets by mouth daily   Next Dose Due:  Resume                                 FISH OIL PO   Take 1 g by mouth daily   Next Dose Due:  Resume                                 fluticasone 50 MCG/ACT spray   Commonly known as:  FLONASE   Spray 1-2 sprays into both nostrils daily as needed   Next Dose Due:  Available as needed                                   Multi-vitamin Tabs tablet   Take 1 tablet by mouth daily   Next Dose Due:  Resume                                order for DME   Equipment being ordered: Walker Wheels () and Walker () Treatment Diagnosis: Impaired gait                                oxyCODONE IR 5 MG tablet   Commonly known as:  ROXICODONE   Take 1 tablet (5 mg) by mouth every 3 hours as needed for other (pain control or improvement in physical function. Hold dose for analgesic side effects.)   Last time this was given:  5 mg on 8/22/2018  9:19 AM   Next Dose Due:  Available as needed                                   senna-docusate 8.6-50 MG per tablet   Commonly known as:  SENOKOT-S;PERICOLACE   Take 1 tablet by mouth 2 times daily   Last time this was given:  2 tablets on 8/22/2018  8:22 AM   Next Dose Due:  8/22/18 PM                    8/22/18

## 2018-08-20 NOTE — ANESTHESIA POSTPROCEDURE EVALUATION
Patient: Serenity Kenyon    Procedure(s):  RIGHT TOTAL HIP ARTHROPLASTY  - Wound Class: I-Clean    Diagnosis:DJD  Diagnosis Additional Information: No value filed.    Anesthesia Type:  General    Note:  Anesthesia Post Evaluation    Patient location during evaluation: PACU  Patient participation: Able to fully participate in evaluation  Level of consciousness: awake  Pain management: adequate  Airway patency: patent  Cardiovascular status: acceptable  Respiratory status: acceptable  Hydration status: acceptable  PONV: none     Anesthetic complications: None          Last vitals:  Vitals:    08/20/18 1410 08/20/18 1420 08/20/18 1430   BP: 145/75 138/84 148/87   Resp: 12 12 13   Temp:      SpO2: 97% 97% 98%         Electronically Signed By: Susanne Palencia  August 20, 2018  2:40 PM

## 2018-08-20 NOTE — ANESTHESIA CARE TRANSFER NOTE
Patient: Serenity Kenyon    Procedure(s):  RIGHT TOTAL HIP ARTHROPLASTY  - Wound Class: I-Clean    Diagnosis: DJD  Diagnosis Additional Information: No value filed.    Anesthesia Type:   General     Note:  Airway :Face Mask  Patient transferred to:PACU  Comments: Neuromuscular blockade reversed after TOF 4/4, spontaneous respirations, adequate tidal volumes, followed commands to voice, oropharynx suctioned with soft flexible catheter, extubated atraumatically, extubated with suction, airway patent after extubation.  Oxygen via facemask at 10 liters per minute to PACU. Oxygen tubing connected to wall O2 in PACU, SpO2, NiBP, and EKG monitors and alarms on and functioning, [place] Hugger warmer connected to patient gown, report on patient's clinical status given to PACU RN, RN questions answered. Handoff Report: Identifed the Patient, Identified the Reponsible Provider, Reviewed the pertinent medical history, Discussed the surgical course, Reviewed Intra-OP anesthesia mangement and issues during anesthesia, Set expectations for post-procedure period and Allowed opportunity for questions and acknowledgement of understanding      Vitals: (Last set prior to Anesthesia Care Transfer)    CRNA VITALS  8/20/2018 1248 - 8/20/2018 1324      8/20/2018             EKG: Atrial fibrillation                Electronically Signed By: ZACHARY Colvin CRNA  August 20, 2018  1:24 PM

## 2018-08-20 NOTE — OR NURSING
Patients vagina is prolapsed outside of body. Light pink in color. Also patients urethral area has some very red tissue prolapsing out. Passed mackay catheter easily.

## 2018-08-20 NOTE — PROGRESS NOTES
Admission medication history interview status for the 8/20/2018  admission is complete. See EPIC admission navigator for prior to admission medications     Medication history source reliability:Good    Medication history interview source(s):Patient    Medication history resources (including written lists, pill bottles, clinic record):None    Primary pharmacy.[facility] ([place]    Additional medication history information not noted on PTA med list :None    Time spent in this activity: 30 minutes    Prior to Admission medications    Medication Sig Last Dose Taking? Auth Provider   Calcium Carb-Cholecalciferol (CALCIUM + D3) 600-200 MG-UNIT TABS Take 2 tablets by mouth daily Over 1 week ago at am Yes Reported, Patient   Fexofenadine HCl (ALLEGRA PO) Take 180 mg by mouth daily as needed for allergies  Over 1 month ago at prn Yes Reported, Patient   fluticasone (FLONASE) 50 MCG/ACT spray Spray 1-2 sprays into both nostrils daily as needed  Over 1 month ago at prn Yes Reported, Patient   multivitamin, therapeutic with minerals (MULTI-VITAMIN) TABS tablet Take 1 tablet by mouth daily 8/10/2018 at am Yes Reported, Patient   ASPIRIN EC PO Take 81 mg by mouth daily 8/16/2018  Reported, Patient   mupirocin (BACTROBAN) 2 % nasal ointment Spray 1 each into both nostrils 2 times daily 08/19/18 at PM  Reported, Patient   Omega-3 Fatty Acids (FISH OIL PO) Take 1 g by mouth daily 8/16/2018  Reported, Patient

## 2018-08-20 NOTE — ANESTHESIA PREPROCEDURE EVALUATION
Anesthesia Evaluation     . Pt has had prior anesthetic.     No history of anesthetic complications          ROS/MED HX    ENT/Pulmonary:      (-) tobacco use, asthma and sleep apnea   Neurologic:      (-) seizures and CVA   Cardiovascular:     (+) hypertension----. : . . . :. dysrhythmias a-fib, .       METS/Exercise Tolerance:     Hematologic:         Musculoskeletal:         GI/Hepatic:        (-) GERD   Renal/Genitourinary:         Endo:      (-) Type II DM and thyroid disease   Psychiatric:         Infectious Disease:         Malignancy:         Other:                     Physical Exam  Normal systems: dental    Airway   Mallampati: II  TM distance: >3 FB  Neck ROM: full    Dental     Cardiovascular   Rhythm and rate: regular      Pulmonary    breath sounds clear to auscultation                    Anesthesia Plan      History & Physical Review  History and physical reviewed and following examination; no interval change.    ASA Status:  2 .        Plan for General and ETT with Intravenous induction. Maintenance will be Balanced.    PONV prophylaxis:  Ondansetron (or other 5HT-3) and Dexamethasone or Solumedrol  Additional equipment: Videolaryngoscope      Postoperative Care  Postoperative pain management:  IV analgesics.      Consents  Anesthetic plan, risks, benefits and alternatives discussed with:  Patient..                          .

## 2018-08-21 ENCOUNTER — APPOINTMENT (OUTPATIENT)
Dept: PHYSICAL THERAPY | Facility: CLINIC | Age: 83
DRG: 470 | End: 2018-08-21
Attending: ORTHOPAEDIC SURGERY
Payer: MEDICARE

## 2018-08-21 LAB
ANION GAP SERPL CALCULATED.3IONS-SCNC: 6 MMOL/L (ref 3–14)
BUN SERPL-MCNC: 20 MG/DL (ref 7–30)
CALCIUM SERPL-MCNC: 8.3 MG/DL (ref 8.5–10.1)
CHLORIDE SERPL-SCNC: 107 MMOL/L (ref 94–109)
CO2 SERPL-SCNC: 25 MMOL/L (ref 20–32)
CREAT SERPL-MCNC: 1.05 MG/DL (ref 0.52–1.04)
GFR SERPL CREATININE-BSD FRML MDRD: 49 ML/MIN/1.7M2
GLUCOSE SERPL-MCNC: 141 MG/DL (ref 70–99)
HGB BLD-MCNC: 12.2 G/DL (ref 11.7–15.7)
MAGNESIUM SERPL-MCNC: 1.9 MG/DL (ref 1.6–2.3)
PLATELET # BLD AUTO: 139 10E9/L (ref 150–450)
POTASSIUM SERPL-SCNC: 4.6 MMOL/L (ref 3.4–5.3)
SODIUM SERPL-SCNC: 138 MMOL/L (ref 133–144)

## 2018-08-21 PROCEDURE — 99207 ZZC NON-BILLABLE SERV PER CHARTING: CPT | Performed by: PHYSICIAN ASSISTANT

## 2018-08-21 PROCEDURE — 12000007 ZZH R&B INTERMEDIATE

## 2018-08-21 PROCEDURE — 93010 ELECTROCARDIOGRAM REPORT: CPT | Performed by: INTERNAL MEDICINE

## 2018-08-21 PROCEDURE — 93005 ELECTROCARDIOGRAM TRACING: CPT

## 2018-08-21 PROCEDURE — 85049 AUTOMATED PLATELET COUNT: CPT | Performed by: ORTHOPAEDIC SURGERY

## 2018-08-21 PROCEDURE — 97161 PT EVAL LOW COMPLEX 20 MIN: CPT | Mod: GP

## 2018-08-21 PROCEDURE — 25800025 ZZH RX 258: Performed by: ORTHOPAEDIC SURGERY

## 2018-08-21 PROCEDURE — 97110 THERAPEUTIC EXERCISES: CPT | Mod: GP

## 2018-08-21 PROCEDURE — 97116 GAIT TRAINING THERAPY: CPT | Mod: GP

## 2018-08-21 PROCEDURE — 97530 THERAPEUTIC ACTIVITIES: CPT | Mod: GP

## 2018-08-21 PROCEDURE — 25000128 H RX IP 250 OP 636: Performed by: ORTHOPAEDIC SURGERY

## 2018-08-21 PROCEDURE — 83735 ASSAY OF MAGNESIUM: CPT | Performed by: NURSE PRACTITIONER

## 2018-08-21 PROCEDURE — 40000193 ZZH STATISTIC PT WARD VISIT

## 2018-08-21 PROCEDURE — 83735 ASSAY OF MAGNESIUM: CPT | Performed by: ORTHOPAEDIC SURGERY

## 2018-08-21 PROCEDURE — 80048 BASIC METABOLIC PNL TOTAL CA: CPT | Performed by: ORTHOPAEDIC SURGERY

## 2018-08-21 PROCEDURE — 85018 HEMOGLOBIN: CPT | Performed by: ORTHOPAEDIC SURGERY

## 2018-08-21 PROCEDURE — 36415 COLL VENOUS BLD VENIPUNCTURE: CPT | Performed by: ORTHOPAEDIC SURGERY

## 2018-08-21 PROCEDURE — 25000132 ZZH RX MED GY IP 250 OP 250 PS 637: Mod: GY | Performed by: ORTHOPAEDIC SURGERY

## 2018-08-21 PROCEDURE — A9270 NON-COVERED ITEM OR SERVICE: HCPCS | Mod: GY | Performed by: ORTHOPAEDIC SURGERY

## 2018-08-21 RX ADMIN — OXYCODONE HYDROCHLORIDE 5 MG: 5 TABLET ORAL at 08:07

## 2018-08-21 RX ADMIN — SENNOSIDES AND DOCUSATE SODIUM 2 TABLET: 8.6; 5 TABLET ORAL at 21:40

## 2018-08-21 RX ADMIN — OXYCODONE HYDROCHLORIDE 5 MG: 5 TABLET ORAL at 12:12

## 2018-08-21 RX ADMIN — ENOXAPARIN SODIUM 30 MG: 30 INJECTION, SOLUTION INTRAVENOUS; SUBCUTANEOUS at 08:08

## 2018-08-21 RX ADMIN — POTASSIUM CHLORIDE, DEXTROSE MONOHYDRATE AND SODIUM CHLORIDE: 150; 5; 450 INJECTION, SOLUTION INTRAVENOUS at 04:13

## 2018-08-21 RX ADMIN — CEFAZOLIN SODIUM 1 G: 1 INJECTION, POWDER, FOR SOLUTION INTRAMUSCULAR; INTRAVENOUS at 04:09

## 2018-08-21 RX ADMIN — ACETAMINOPHEN 975 MG: 325 TABLET, FILM COATED ORAL at 08:07

## 2018-08-21 RX ADMIN — OXYCODONE HYDROCHLORIDE 5 MG: 5 TABLET ORAL at 21:40

## 2018-08-21 RX ADMIN — ACETAMINOPHEN 975 MG: 325 TABLET, FILM COATED ORAL at 16:03

## 2018-08-21 RX ADMIN — SENNOSIDES AND DOCUSATE SODIUM 2 TABLET: 8.6; 5 TABLET ORAL at 08:07

## 2018-08-21 ASSESSMENT — ACTIVITIES OF DAILY LIVING (ADL)
ADLS_ACUITY_SCORE: 10

## 2018-08-21 NOTE — PROGRESS NOTES
[facility]    Hospitalist Progress Note      Assessment & Plan   This is a 89 year old female with a past medical history of HTN, atrial fibrillation and seasonal allergies. She presents today for an elective right total hip arthroplasty.     S/p Right ISA,  -- POD 1  -- Routine post-op cares and pain control per Ortho      Paroxsymal Afib: Dx at pre-op. Referred to Cardiology. Cardiology recommended initating Eliquis after surgery  -- Rate controled without medications  -- Start Eliquis on discharge if ok per Ortho, patient already has a script at home        Pain Assessment:  Current Pain Score 8/21/2018   Patient currently in pain? yes   Pain score (0-10) 1   Pain location -   Pain descriptors -   - Serenity is experiencing pain due to s/p L ISA. Pain management was discussed and the plan was created in a collaborative fashion.  Serenity's response to the current recommendations: engaged  - Per Ortho    DVT Prophylaxis: Defer to primary service, currently Lovenox  Code Status: Full Code    Disposition: Per Ortho    Heather Barclay    Interval History   Doing well. Pain well controlled. Discussed anticoagulation plans with patient. Patient already has Eliquis as at home    -Data reviewed today: I reviewed all new labs and imaging results over the last 24 hours. I personally reviewed no images or EKG's today.    Physical Exam   Temp: 97.7  F (36.5  C) Temp src: Oral BP: 115/61 Pulse: 70 Heart Rate: 75 Resp: 16 SpO2: 97 % O2 Device: Nasal cannula Oxygen Delivery: 3 LPM  Vitals:    08/20/18 1022   Weight: 59 kg (130 lb)     Vital Signs with Ranges  Temp:  [97.3  F (36.3  C)-98  F (36.7  C)] 97.7  F (36.5  C)  Pulse:  [70-83] 70  Heart Rate:  [] 75  Resp:  [12-23] 16  BP: (114-153)/() 115/61  SpO2:  [95 %-100 %] 97 %  I/O last 3 completed shifts:  In: 1500 [I.V.:1500]  Out: 3525 [Urine:3150; Drains:125; Blood:250]    Constitutional: Alert, resting comfortably in NAD  Respiratory: Normal  effort, symmetric expansion, no crackles or wheezing  Cardiovascular: Irregular, rate controlled no murmurs   GI: Non distended, normal bowels sounds, no tenderness or guarding  MSK: LE without edema. Dorsalis pedis pulse palpated bilaterally.   Skin/Integumen: Clear  Neuro: CN II-XII grossly intact  Psych:  Alert and oriented x 3. Normal affect      Medications     dextrose 5% and 0.45% NaCl + KCl 20 mEq/L Stopped (08/21/18 0951)       acetaminophen  975 mg Oral Q8H     enoxaparin  30 mg Subcutaneous Q24H     senna-docusate  1 tablet Oral BID    Or     senna-docusate  2 tablet Oral BID     sodium chloride (PF)  3 mL Intracatheter Q8H       Data     Recent Labs  Lab 08/21/18  0629 08/20/18  1029   HGB 12.2 15.2   * 171    142   POTASSIUM 4.6 4.4   CHLORIDE 107 110*   CO2 25 21   BUN 20 25   CR 1.05* 1.31*   ANIONGAP 6 11   RUIZ 8.3* 9.5   * 89       Recent Results (from the past 24 hour(s))   XR Pelvis w Hip Port Right 1 View    Narrative    XR PELVIS AD HIP PORTABLE RIGHT 1 VIEW 8/20/2018 1:54 PM    COMPARISON: None.    HISTORY: Arthroplasty.      Impression    IMPRESSION: RIGHT total hip arthroplasty. Hardware appears intact. No  fractures are seen. Surgical drain in place. Mild/moderate LEFT hip  osteoarthritis.    KALYANI MARTINEZ MD

## 2018-08-21 NOTE — PLAN OF CARE
Problem: Hip Arthroplasty (Total, Partial) (Adult)  Goal: Signs and Symptoms of Listed Potential Problems Will be Absent, Minimized or Managed (Hip Arthroplasty)  Signs and symptoms of listed potential problems will be absent, minimized or managed by discharge/transition of care (reference Hip Arthroplasty (Total, Partial) (Adult) CPG).   Outcome: Adequate for Discharge Date Met: 08/21/18  Pt A/Ox4. VSS. Up 1 assist w/ walker. Reports pain 1/10 using oxycodone and tylenol with relief. Regular diet tolerated. CMS intact. Dressing changed. Voiding adequately.

## 2018-08-21 NOTE — PROGRESS NOTES
[facility] Orthopedic Post-Op / Progress Note  Serenity Kenyon is a 89 year old female    Today's Date:2018  Admission Date: 2018  POD # 1 ISA         Interval History:   doing well  Good pain control            Physical Exam:   All vitals have been reviewed  Temperatures:  Current - Temp: 97.7  F (36.5  C); Max - Temp  Av.6  F (36.4  C)  Min: 97.3  F (36.3  C)  Max: 97.8  F (36.6  C)  Pulse range: Pulse  Av.2  Min: 70  Max: 83  Blood pressure range: Systolic (24hrs), Av , Min:114 , Max:133   ; Diastolic (24hrs), Av, Min:61, Max:82      Intake/Output Summary (Last 24 hours) at 18 1629  Last data filed at 18 1212   Gross per 24 hour   Intake              480 ml   Output             3375 ml   Net            -2895 ml       Wound clean and dry with minimal or no drainage.  Surrounding skin with minimal erythema.          Data:   All laboratory data related to this surgery reviewed      Lab Results   Component Value Date     2018    POTASSIUM 4.6 2018    CHLORIDE 107 2018    CO2 25 2018     (H) 2018     Lab Results   Component Value Date    HGB 12.2 2018    HGB 15.2 2018     Platelet Count (10e9/L)   Date Value   2018 139 (L)   2018 171       All imaging studies related to this surgery reviewed  Hardware is intact and in good approximation         Assessment and Plan:    Assessment:  Doing well.  No immediate surgical complications identified.  No excessive bleeding  Pain well-controlled.  Tolerating physical therapy and rehabilitation well.    Plan:  Continue physical therapy  Pain control measures  Discharge plan: Home with family tomorrow or Thursday    Naren Kimball MD        generalized

## 2018-08-21 NOTE — PLAN OF CARE
Problem: Patient Care Overview  Goal: Plan of Care/Patient Progress Review  Outcome: Improving  Patient is A&O, VSS on 2L NC, CMS intact, regular diet, up with assist of 1, IV infusing and dressing CDI. Hemovac patent, Ramsey d/c -DTV, Tele is Afib CVR, oxycodone and Tylenol for pain control, and dressing CDI. Will continue to monitor

## 2018-08-21 NOTE — PROGRESS NOTES
08/21/18 0919   Quick Adds   Type of Visit Initial PT Evaluation   Living Environment   Lives With alone   Living Arrangements house   Home Accessibility stairs within home;stairs to enter home   Number of Stairs to Enter Home 8   Number of Stairs Within Home 11   Stair Railings at Home inside, present on left side;outside, present on left side   Transportation Available car   Self-Care   Usual Activity Tolerance good   Current Activity Tolerance moderate   Equipment Currently Used at Home none   Activity/Exercise/Self-Care Comment currently borrowing a FWW for post-op   Functional Level Prior   Ambulation 0-->independent   Transferring 0-->independent   Fall history within last six months no   Which of the above functional risks had a recent onset or change? ambulation;transferring   General Information   Onset of Illness/Injury or Date of Surgery - Date 08/20/18   Referring Physician Naren Kimball MD   Patient/Family Goals Statement return home   Pertinent History of Current Problem (include personal factors and/or comorbidities that impact the POC) Pt admitted s/p R ISA posterolateral approach. PMH includes: HTN, paroxysmal atrial tachycardia, vasculitis, urethra caruncle, ABMD   Precautions/Limitations right hip precautions;oxygen therapy device and L/min;fall precautions   Weight-Bearing Status - LLE full weight-bearing   Weight-Bearing Status - RLE full weight-bearing   Cognitive Status Examination   Orientation orientation to person, place and time   Level of Consciousness alert   Follows Commands and Answers Questions 100% of the time;able to follow single-step instructions   Personal Safety and Judgment intact   Pain Assessment   Patient Currently in Pain Yes, see Vital Sign flowsheet   Posture    Posture Forward head position   Range of Motion (ROM)   ROM Quick Adds No deficits were identified   Strength   Strength Comments functional antigravity strength of B LE   Bed Mobility   Bed Mobility  "Comments Pt educated on R hip precautions prior to moving in bed. Pt moved supine with HOB elevated to sitting EOB with [place] for R LE placement and mod hand assist for elevating trunk.    Transfer Skills   Transfer Comments Pt moved sit <> stand with CGA and cuing for appropriate assistive device use for transfers.    Gait   Gait Comments Pt ambulated 10' with CGA and FWW use. Pt demonstrated weight bearing as tolerated through R LE, decreased step length, and decreased romeo.    Balance   Balance Comments no unsteadiness noted in standing or during gait    General Therapy Interventions   Planned Therapy Interventions bed mobility training;gait training;strengthening;transfer training   Clinical Impression   Criteria for Skilled Therapeutic Intervention yes, treatment indicated   PT Diagnosis difficulty transferring and ambulating   Influenced by the following impairments pain, weakness   Functional limitations due to impairments bed mobility, transfers, ambulation    Clinical Presentation Stable/Uncomplicated   Clinical Presentation Rationale medically stable   Clinical Decision Making (Complexity) Low complexity   Therapy Frequency` 2 times/day   Predicted Duration of Therapy Intervention (days/wks) 3 days   Anticipated Equipment Needs at Discharge front wheeled walker  (pt wants an additional FWW for the lower level of her home )   Anticipated Discharge Disposition Home with Assist   Risk & Benefits of therapy have been explained Yes   Patient, Family & other staff in agreement with plan of care Yes   [facility] [employer]efits-PAC TM \"6 Clicks\"   2016, Trustees of [facility], under license to [employer].  All rights reserved.   6 Clicks Short Forms Basic Mobility Inpatient Short Form   [facility] AM-PAC  \"6 Clicks\" V.2 Basic Mobility Inpatient Short Form   1. Turning from your back to your side while in a flat bed without using bedrails? 3 - A Little   2. Moving from lying on your back to sitting on " the side of a flat bed without using bedrails? 3 - A Little   3. Moving to and from a bed to a chair (including a wheelchair)? 3 - A Little   4. Standing up from a chair using your arms (e.g., wheelchair, or bedside chair)? 3 - A Little   5. To walk in hospital room? 3 - A Little   6. Climbing 3-5 steps with a railing? 3 - A Little   Basic Mobility Raw Score (Score out of 24.Lower scores equate to lower levels of function) 18   Total Evaluation Time   Total Evaluation Time (Minutes) 10

## 2018-08-21 NOTE — PLAN OF CARE
Problem: Patient Care Overview  Goal: Plan of Care/Patient Progress Review  PT:  Discharge Planner PT   Patient plan for discharge: home  Current status: Orders received, eval completed, treatment initiated. Pt admitted s/p R ISA posterolateral approach. Prior to admission pt was living independently at home and independent with all functional mobility. Pt currently requires min-modA for bed mobility, CGA and FWW with sit <> stands, and CGA with FWW for 75' of ambulation on 3L O2 NC with no SOB. Pt completed supine bed exercises and reports understanding of hip precautions.   Barriers to return to prior living situation: falls risk  Recommendations for discharge: home with assist for stairs   Rationale for recommendations: Pt will continue to benefit from inpatient physical therapy services to increase her strength, and safety and independence with bed mobility, transfers, ambulation, and stairs prior to returning home.        Entered by: Debbie Thomas 08/21/2018 10:24 AM

## 2018-08-21 NOTE — CONSULTS
Hospitalist Consult Note  8/20/2018 2020      This is a 89 year old female with a past medical history of HTN, atrial fibrillation and seasonal allergies. She presents today for an elective right total hip arthroplasty. Surgery was performed by Dr. Naren Kimball, under spinal anesthesia without any surgical complications noted. The patient remains with vital signs within normal limits, pain is well managed, and care thus far is as expected. I have reviewed the H&P, reviewed and reconciled prior to admission medications. I have also discussed this patient's care with the bedside nurse who denies any acute complications at this time.    The patient was evaluated by a cardiologist prior to her scheduled right hip arthroplasty, who recommended long-term anticoagulation on Eliquis following surgery.  She had EKGs preoperatively demonstrating both atrial fibrillation with CVR and sinus bradycardia.  Echocardiogram reveals EF 65% mild mitral and tricuspid regurgitation.  Patient does have a history of hypertension, however she is not currently taking any medications and her blood pressure has been well controlled preoperatively and postoperatively.  --I will obtain a postoperative EKG and order telemetry for now to assess for any atrial fibrillation onset  --The patient is currently on Lovenox per ortho surgery    Given the above, will defer formal consult.  I would recommend the patient follows up with her primary care provider, orthopedic surgery and cardiologist to determine an anticoagulation plan going forward. The Hospitalist Service will chart check this patient on 8/21 to assess rhythm, and BP.    Routine post-operative cares, IVF, DVT prophylaxis, and pain management to orthopedic service. Recommend judicious use of narcotics given patient's age and risk for delirium. Will check some basic lab work in the AM to assess for acute blood loss anemia given surgery. PT and OT to assess per Ortho. Bowel regimen in place  while on pain regimen. No changes to PTA medication regimen at discharge unless issues arise throughout stay.  Happy to be reconsulted in the future if medical issues arise. The Hospitalist service appreciates this consult.       ZACHARY [facility]  Hospitalist Service  Pager: [phone] (7a - 6p)

## 2018-08-22 ENCOUNTER — APPOINTMENT (OUTPATIENT)
Dept: PHYSICAL THERAPY | Facility: CLINIC | Age: 83
DRG: 470 | End: 2018-08-22
Attending: ORTHOPAEDIC SURGERY
Payer: MEDICARE

## 2018-08-22 ENCOUNTER — APPOINTMENT (OUTPATIENT)
Dept: OCCUPATIONAL THERAPY | Facility: CLINIC | Age: 83
DRG: 470 | End: 2018-08-22
Attending: ORTHOPAEDIC SURGERY
Payer: MEDICARE

## 2018-08-22 VITALS
SYSTOLIC BLOOD PRESSURE: 122 MMHG | DIASTOLIC BLOOD PRESSURE: 69 MMHG | TEMPERATURE: 98 F | HEART RATE: 85 BPM | BODY MASS INDEX: 25.52 KG/M2 | HEIGHT: 60 IN | OXYGEN SATURATION: 93 % | RESPIRATION RATE: 16 BRPM | WEIGHT: 130 LBS

## 2018-08-22 LAB
HGB BLD-MCNC: 11.7 G/DL (ref 11.7–15.7)
PLATELET # BLD AUTO: 127 10E9/L (ref 150–450)

## 2018-08-22 PROCEDURE — 25000132 ZZH RX MED GY IP 250 OP 250 PS 637: Mod: GY | Performed by: ORTHOPAEDIC SURGERY

## 2018-08-22 PROCEDURE — 25000128 H RX IP 250 OP 636: Performed by: ORTHOPAEDIC SURGERY

## 2018-08-22 PROCEDURE — 85018 HEMOGLOBIN: CPT | Performed by: ORTHOPAEDIC SURGERY

## 2018-08-22 PROCEDURE — 97110 THERAPEUTIC EXERCISES: CPT | Mod: GP

## 2018-08-22 PROCEDURE — 97535 SELF CARE MNGMENT TRAINING: CPT | Mod: GO

## 2018-08-22 PROCEDURE — 40000193 ZZH STATISTIC PT WARD VISIT

## 2018-08-22 PROCEDURE — A9270 NON-COVERED ITEM OR SERVICE: HCPCS | Mod: GY | Performed by: ORTHOPAEDIC SURGERY

## 2018-08-22 PROCEDURE — 36415 COLL VENOUS BLD VENIPUNCTURE: CPT | Performed by: ORTHOPAEDIC SURGERY

## 2018-08-22 PROCEDURE — 85049 AUTOMATED PLATELET COUNT: CPT | Performed by: ORTHOPAEDIC SURGERY

## 2018-08-22 PROCEDURE — 40000133 ZZH STATISTIC OT WARD VISIT

## 2018-08-22 PROCEDURE — 97165 OT EVAL LOW COMPLEX 30 MIN: CPT | Mod: GO

## 2018-08-22 PROCEDURE — 97116 GAIT TRAINING THERAPY: CPT | Mod: GP

## 2018-08-22 RX ORDER — OXYCODONE HYDROCHLORIDE 5 MG/1
5 TABLET ORAL
Qty: 40 TABLET | Refills: 0 | Status: SHIPPED | OUTPATIENT
Start: 2018-08-22

## 2018-08-22 RX ORDER — AMOXICILLIN 250 MG
1 CAPSULE ORAL 2 TIMES DAILY
Qty: 50 TABLET | Refills: 0 | Status: SHIPPED | OUTPATIENT
Start: 2018-08-22

## 2018-08-22 RX ADMIN — ENOXAPARIN SODIUM 30 MG: 30 INJECTION, SOLUTION INTRAVENOUS; SUBCUTANEOUS at 08:22

## 2018-08-22 RX ADMIN — SENNOSIDES AND DOCUSATE SODIUM 2 TABLET: 8.6; 5 TABLET ORAL at 08:22

## 2018-08-22 RX ADMIN — ACETAMINOPHEN 975 MG: 325 TABLET, FILM COATED ORAL at 15:37

## 2018-08-22 RX ADMIN — ACETAMINOPHEN 975 MG: 325 TABLET, FILM COATED ORAL at 08:22

## 2018-08-22 RX ADMIN — OXYCODONE HYDROCHLORIDE 5 MG: 5 TABLET ORAL at 01:59

## 2018-08-22 RX ADMIN — OXYCODONE HYDROCHLORIDE 5 MG: 5 TABLET ORAL at 09:19

## 2018-08-22 ASSESSMENT — ACTIVITIES OF DAILY LIVING (ADL)
ADLS_ACUITY_SCORE: 10

## 2018-08-22 NOTE — PROGRESS NOTES
[facility] Orthopedic Post-Op / Progress Note  Serenity Kenyon is a 89 year old female    Today's Date:2018  Admission Date: 2018  POD # 2 ISA         Interval History:   doing well  Mobilizing very nicely and wants to go home            Physical Exam:   All vitals have been reviewed  Temperatures:  Current - Temp: 98.2  F (36.8  C); Max - Temp  Av.1  F (36.7  C)  Min: 97.7  F (36.5  C)  Max: 98.8  F (37.1  C)  Pulse range: Pulse  Av  Min: 85  Max: 85  Blood pressure range: Systolic (24hrs), Av , Min:102 , Max:133   ; Diastolic (24hrs), Av, Min:55, Max:70      Intake/Output Summary (Last 24 hours) at 18 0949  Last data filed at 18 0822   Gross per 24 hour   Intake             1270 ml   Output              200 ml   Net             1070 ml       Wound clean and dry with minimal or no drainage.  Surrounding skin with minimal erythema.          Data:   All laboratory data related to this surgery reviewed      Lab Results   Component Value Date     2018    POTASSIUM 4.6 2018    CHLORIDE 107 2018    CO2 25 2018     (H) 2018     Lab Results   Component Value Date    HGB 11.7 2018    HGB 12.2 2018    HGB 15.2 2018     Platelet Count (10e9/L)   Date Value   2018 127 (L)   2018 139 (L)   2018 171       All imaging studies related to this surgery reviewed         Assessment and Plan:    Assessment:  Doing well.  No immediate surgical complications identified.  No excessive bleeding  Pain well-controlled.  Tolerating physical therapy and rehabilitation well.    Plan:  Continue physical therapy  Pain control measures  Discharge plan: Home today    Naren Kimball MD

## 2018-08-22 NOTE — PLAN OF CARE
Problem: Patient Care Overview  Goal: Plan of Care/Patient Progress Review  Outcome: Improving  Patient is A&O, VSS on RA, CMS intact, regular diet, up with assist of 1 to the bathroom, and dressing CDI. Tele is Afib/aflutter CVR andoxycodone and Tylenol for pain control. Will continue to monitor

## 2018-08-22 NOTE — PLAN OF CARE
Problem: Patient Care Overview  Goal: Plan of Care/Patient Progress Review  Discharge Planner OT   Patient plan for discharge: Home  Current status: Eval received and completed. Pt presents after R ISA, Currently SBA for all ADLs. Taught LB dressing using sock aid/reacher. She is borrowing her sons reacher. Pt plans to go downstairs to her shower for bathing. DTR will be around to assist prn. Pt safe to discharge from OT   Barriers to return to prior living situation: none  Recommendations for discharge: Home  Rationale for recommendations: Pt demonstrated safety with ADLs following precautions. Will discharge from therapy.        Entered by: Luiz Haimlton 08/22/2018 8:15 AM

## 2018-08-22 NOTE — PLAN OF CARE
Problem: Hip Arthroplasty (Total, Partial) (Adult)  Goal: Anesthesia/Sedation Recovery  Outcome: Adequate for Discharge Date Met: 08/22/18  Pt A/Ox4. VSS. Up 1 assist w/ walker. Reports pain 1/10 using oxycodone with relief. Regular diet tolerated. CMS intact Dressing changed. Voiding adequately. Discharge to home with daughter at 1600 via wheelchair. AVS and cost of medication reviewed with pt at discharge.

## 2018-08-22 NOTE — PLAN OF CARE
Problem: Patient Care Overview  Goal: Plan of Care/Patient Progress Review  PT:  Discharge Planner PT   Patient plan for discharge: Home with daughter assist  Current status: Supine<>sit with SBA. Sit<>stand with CGA and FWW. Ascended/descended 3 steps x2 with CGA and B rail use, step to gait pattern. Ambulated 175 feet with CGA and FWW. Participated in supine exercises.  Barriers to return to prior living situation: Fall risk  Recommendations for discharge: Home with assist for stairs as needed.  Rationale for recommendations: Pt is progressing well and should be safe to return home when discharged. Assist from family as needed for mobility.        Entered by: Jatin Burden 08/22/2018 10:55 AM

## 2018-08-22 NOTE — DISCHARGE INSTRUCTIONS
DISCHARGE INSTRUCTIONS FOR YOUR TOTAL HIP REPLACEMENT     SORAIDA CARRENO MD    Wound Care:    Change your dressing daily. Inspect your incision at the time of dressing change for increased redness, swelling, and drainage.  Some discoloration and bruising is usually seen, but call my office if you are concerned or if you see changes in the wound appearance.  Also, call if you develop a fever above 101 degrees.     You may shower directly over the wound beginning 4 days after your surgery, but do not submerge wound under water until after the post operative clinic visit when the sutures are removed and the wound is completely sealed and without any drainage. Keep a dressing over the wound until after your post operative clinic visit.      Activities and outpatient Physical Therapy:  Physical activity may be resumed gradually according to your comfort level and the restrictions on your hip positioning as instructed in the pre-op class and by therapy. Do not cross your legs and do not bend past 90 degrees. You may bear weight as tolerated on the operated leg.  Use crutches or the walker as instructed by Physical Therapy.  Follow your home exercise program as instructed by your therapist.     Wear your anti-embolism stockings day and night until seen for your post operative appointment.  Keep them flat on your skin.  Do not allow them to roll up or crease your skin.  Remove twice daily for one hour at a time.  You may hand wash and air dry your stockings.  Notify my office if you experience calf pain.  Pump your ankles frequently.        If indicated, you may have outpatient physical therapy sessions when your return home. These visits are prescribed for you at the time of your surgery scheduling.  If so, you should have already scheduled your therapy sessions in advance.  If you have not done so, please immediately contact the therapy location of your choice to schedule the appointments for the physical therapy regimen  that has been prescribed for you. You may discontinue the crutches or walker per the therapist's recommendation.      Medications:  New medications for you on discharge include a pain medication, a stool softener, and a blood thinner (BEGIN THE ELIQUIS MEDICATION Thursday, 8/23/18)   Detailed instructions come with those medications.  You will also receive instructions on when to resume your home medications.       Antibiotic coverage will be needed before any type of dental procedure. This is a life long recommendation.  You should notify your dentist of your total hip surgery and call your dentist or our office one week before a dental appointment for antibiotics.         Post operative clinic appointment:  Your post operative clinic visit has been prearranged.   Call [phone] with any questions.    Naren Kimball MD

## 2018-08-22 NOTE — PLAN OF CARE
Problem: Patient Care Overview  Goal: Plan of Care/Patient Progress Review  PT:  Discharge Planner PT   Patient plan for discharge: home with assist  Current status: Pt ambulated 100' with SBA and FWW, and ascended/descended 9 stairs with SBA and no assistive device with step to gait pattern.Pt is independent with bed mobility and SBA for transfers. Pt completed supine exercises.   Barriers to return to prior living situation: falls risk  Recommendations for discharge: home with assist for stairs  Rationale for recommendations: Pt will benefit from skilled therapy to increase her strength and mobility and safety with transfers, ambulation, and stairs prior to returning home        Entered by: Debbie Thomas 08/22/2018 4:00 PM

## 2018-08-22 NOTE — PROGRESS NOTES
08/22/18 0803   Quick Adds   Type of Visit Initial Occupational Therapy Evaluation   Living Environment   Lives With alone   Living Arrangements house   Home Accessibility stairs within home;stairs to enter home   Number of Stairs to Enter Home 8   Number of Stairs Within Home 11   Stair Railings at Home inside, present on left side;outside, present on left side   Transportation Available car   Living Environment Comment Lives in a one story house, shower in basement. DTR going to stay with Pt   Self-Care   Dominant Hand right   Usual Activity Tolerance good   Equipment Currently Used at Home none   Functional Level Prior   Ambulation 0-->independent   Transferring 0-->independent   Toileting 0-->independent   Bathing 0-->independent   Dressing 0-->independent   Eating 0-->independent   Communication 0-->understands/communicates without difficulty   Swallowing 0-->swallows foods/liquids without difficulty   Cognition 0 - no cognition issues reported   Fall history within last six months no   Which of the above functional risks had a recent onset or change? ambulation;transferring   Prior Functional Level Comment Independent with ADLS/IADLs at home, drives   General Information   Onset of Illness/Injury or Date of Surgery - Date 08/20/18   Referring Physician Naren Kimball MD   Additional Occupational Profile Info/Pertinent History of Current Problem s/p R ISA 8/20   Precautions/Limitations right hip precautions   Weight-Bearing Status - LUE full weight-bearing   Weight-Bearing Status - RUE full weight-bearing   General Observations Recieved in bed, Agreed to Therapy, Bed alarm on   Cognitive Status Examination   Orientation orientation to person, place and time   Visual Perception   Visual Perception No deficits were identified   Range of Motion (ROM)   ROM Comment Full range in BUE   Coordination   Upper Extremity Coordination No deficits were identified   Bed Mobility Skill: Supine to Sit   Level of  "[place]: Supine/Sit stand-by assist   Transfer Skill: Bed to Chair/Chair to Bed   Level of [place]: Bed to Chair stand-by assist   Physical Assist/Nonphysical Assist: Bed to Chair 1 person assist   Weight-Bearing Restrictions full weight-bearing   Transfer Skill: Sit to Stand   Level of [place]: Sit/Stand stand-by assist   Transfer Skill: Toilet Transfer   Level of [place]: Toilet stand-by assist   Physical Assist/Nonphysical Assist: Toilet 1 person assist   Weight-Bearing Restrictions: Toilet full weight-bearing   Assistive Device standard walker   Balance   Balance Comments No LOB sitting EOB, SBA for static standing   Bathing   Level of [place] stand-by assist   Upper Body Dressing   Level of [place]: Dress Upper Body independent   Lower Body Dressing   Level of [place]: Dress Lower Body stand-by assist   Physical Assist/Nonphysical Assist: Dress Lower Body 1 person assist   Toileting   Level of [place]: Toilet stand-by assist   Physical Assist/Nonphysical Assist: Toilet set-up required   Grooming   Level of [place]: Grooming stand-by assist   Instrumental Activities of Daily Living (IADL)   Previous Responsibilities meal prep;housekeeping;laundry;yardwork;shopping   General Therapy Interventions   Planned Therapy Interventions ADL retraining   Clinical Impression   Criteria for Skilled Therapeutic Interventions Met evaluation only   OT Diagnosis Limatations in LB dressing and showering   Influenced by the following impairments Hip pracautions   Assessment of Occupational Performance 1-3 Performance Deficits   Clinical Decision Making (Complexity) Low complexity   Anticipated Discharge Disposition Home   Risks and Benefits of Treatment have been explained. Yes   Patient, Family & other staff in agreement with plan of care Yes   [facility] AM-PAC  \"6 Clicks\" Daily Activity Inpatient Short Form   1. Putting on and taking off regular lower body clothing? 3 - A " Little   2. Bathing (including washing, rinsing, drying)? 3 - A Little   3. Toileting, which includes using toilet, bedpan or urinal? 4 - None   4. Putting on and taking off regular upper body clothing? 4 - None   5. Taking care of personal grooming such as brushing teeth? 4 - None   6. Eating meals? 4 - None   Daily Activity Raw Score (Score out of 24.Lower scores equate to lower levels of function) 22   Total Evaluation Time   Total Evaluation Time (Minutes) 12

## 2018-08-22 NOTE — DISCHARGE SUMMARY
DISCHARGE SUMMARY    NAME:  Serenity Kenyon  AGE:  89 year old  YOB: 1929  MRN#:  [id number]    Serenity Kenyon was admitted for elective total hip arthroplasty.  The surgery was performed on 8/20/2018.  The postoperative course is documented in the medical record.  There were no complications. The patient was felt ready for discharge home on POD #2 with post-discharge physical therapy and outpatient visit prearranged.     Lab Results   Component Value Date    HGB 11.7 08/22/2018    HGB 12.2 08/21/2018    HGB 15.2 08/20/2018       The patient received 0 units transfusion.      FINAL DISCHARGE DIAGNOSIS:  Degenerative osteoarthritis hip.               Acute blood loss anemia     SURGICAL PROCEDURE THIS ADMISSION:   total hip arthroplasty.         NAREN CARRENO MD      CC: Fax [phone]         Naren Carreno MD

## 2018-08-23 ENCOUNTER — DOCUMENTATION ONLY (OUTPATIENT)
Dept: OTHER | Facility: CLINIC | Age: 83
End: 2018-08-23

## 2018-08-23 NOTE — PLAN OF CARE
Problem: Patient Care Overview  Goal: Plan of Care/Patient Progress Review  Physical Therapy Discharge Summary    Reason for therapy discharge:    Discharged to home.    Progress towards therapy goal(s). See goals on Care Plan in [facility] electronic health record for goal details.  Goals partially met.  Barriers to achieving goals:   discharge from facility and Pt met goal for IND with bed mobility, did not meet goal for transfers/gait/stairs d/t still needing supervision.    Therapy recommendation(s):    Continue home exercise program.

## 2018-08-24 LAB — INTERPRETATION ECG - MUSE: NORMAL

## (undated) DEVICE — PREP CHLORAPREP 26ML TINTED ORANGE  260815

## (undated) DEVICE — CATH TRAY FOLEY SURESTEP 16FR DRAIN BAG STATOCK A899916

## (undated) DEVICE — IMM PILLOW ABDUCT HIP MED 31143061

## (undated) DEVICE — SUCTION IRR SYSTEM W/O TIP INTERPULSE HANDPIECE 0210-100-000

## (undated) DEVICE — DRSG XEROFORM 5X9" 8884431605

## (undated) DEVICE — MANIFOLD NEPTUNE 4 PORT 700-20

## (undated) DEVICE — GLOVE PROTEXIS POWDER FREE 8.5 ORTHOPEDIC 2D73ET85

## (undated) DEVICE — GLOVE PROTEXIS POWDER FREE 8.0 ORTHOPEDIC 2D73ET80

## (undated) DEVICE — GLOVE PROTEXIS W/NEU-THERA 8.0  2D73TE80

## (undated) DEVICE — SPONGE PACK VAGINAL 2"X9

## (undated) DEVICE — LINEN TOWEL PACK X5 5464

## (undated) DEVICE — PACK TOTAL HIP W/U DRAPE SOP15HUFSC

## (undated) DEVICE — DRAPE IOBAN INCISE 23X17" 6650EZ

## (undated) DEVICE — SU VICRYL 0 CTX 36" J370H

## (undated) DEVICE — BLADE SAW SAGITTAL STRK 25X79.5X1.24MM 4/2000 2108-318-000

## (undated) DEVICE — BONE CLEANING TIP INTERPULSE  0210-010-000

## (undated) DEVICE — HOOD FLYTE W/PEELAWAY 408-800-100

## (undated) DEVICE — SOL NACL 0.9% IRRIG 1000ML BOTTLE 07138-09

## (undated) DEVICE — ESU GROUND PAD UNIVERSAL W/O CORD

## (undated) DEVICE — WIPES FOLEY CARE SURESTEP PROVON DFC100

## (undated) DEVICE — SU ETHIBOND 1 CTX 30" X865H

## (undated) DEVICE — DRAIN ROUND W/RESERV KIT JACKSON PRATT 10FR 400ML SU130-402D

## (undated) DEVICE — SU VICRYL 2-0 CP-1 27" UND J266H

## (undated) DEVICE — SU WND CLOSURE VLOC 180 ABS 0 24" GS-25 VLOCL0436

## (undated) DEVICE — PREP SKIN SCRUB TRAY 4461A

## (undated) DEVICE — SOL WATER IRRIG 1000ML BOTTLE 2F7114

## (undated) RX ORDER — ESMOLOL HYDROCHLORIDE 10 MG/ML
INJECTION INTRAVENOUS
Status: DISPENSED
Start: 2018-08-20

## (undated) RX ORDER — HYDROMORPHONE HYDROCHLORIDE 1 MG/ML
INJECTION, SOLUTION INTRAMUSCULAR; INTRAVENOUS; SUBCUTANEOUS
Status: DISPENSED
Start: 2018-08-20

## (undated) RX ORDER — METOPROLOL TARTRATE 1 MG/ML
INJECTION, SOLUTION INTRAVENOUS
Status: DISPENSED
Start: 2018-08-20

## (undated) RX ORDER — FENTANYL CITRATE 50 UG/ML
INJECTION, SOLUTION INTRAMUSCULAR; INTRAVENOUS
Status: DISPENSED
Start: 2018-08-20

## (undated) RX ORDER — CEFAZOLIN SODIUM 2 G/100ML
INJECTION, SOLUTION INTRAVENOUS
Status: DISPENSED
Start: 2018-08-20

## (undated) RX ORDER — ACETAMINOPHEN 500 MG
TABLET ORAL
Status: DISPENSED
Start: 2018-08-20

## (undated) RX ORDER — LABETALOL HYDROCHLORIDE 5 MG/ML
INJECTION, SOLUTION INTRAVENOUS
Status: DISPENSED
Start: 2018-08-20